# Patient Record
Sex: FEMALE | Race: BLACK OR AFRICAN AMERICAN | HISPANIC OR LATINO | URBAN - METROPOLITAN AREA
[De-identification: names, ages, dates, MRNs, and addresses within clinical notes are randomized per-mention and may not be internally consistent; named-entity substitution may affect disease eponyms.]

---

## 2017-12-28 ENCOUNTER — EMERGENCY (EMERGENCY)
Facility: HOSPITAL | Age: 32
LOS: 0 days | Discharge: HOME | End: 2017-12-28

## 2017-12-28 DIAGNOSIS — R11.0 NAUSEA: ICD-10-CM

## 2017-12-28 DIAGNOSIS — R05 COUGH: ICD-10-CM

## 2017-12-28 DIAGNOSIS — Z79.899 OTHER LONG TERM (CURRENT) DRUG THERAPY: ICD-10-CM

## 2017-12-28 DIAGNOSIS — R07.89 OTHER CHEST PAIN: ICD-10-CM

## 2017-12-28 DIAGNOSIS — Z88.0 ALLERGY STATUS TO PENICILLIN: ICD-10-CM

## 2017-12-28 DIAGNOSIS — F32.9 MAJOR DEPRESSIVE DISORDER, SINGLE EPISODE, UNSPECIFIED: ICD-10-CM

## 2017-12-28 DIAGNOSIS — R74.0 NONSPECIFIC ELEVATION OF LEVELS OF TRANSAMINASE AND LACTIC ACID DEHYDROGENASE [LDH]: ICD-10-CM

## 2017-12-28 DIAGNOSIS — R06.02 SHORTNESS OF BREATH: ICD-10-CM

## 2020-11-12 ENCOUNTER — INPATIENT (INPATIENT)
Facility: HOSPITAL | Age: 35
LOS: 21 days | Discharge: HOME | DRG: 751 | End: 2020-12-04
Attending: PSYCHIATRY & NEUROLOGY | Admitting: PSYCHIATRY & NEUROLOGY
Payer: MEDICAID

## 2020-11-12 VITALS — HEIGHT: 63 IN | WEIGHT: 134.04 LBS | TEMPERATURE: 98 F | OXYGEN SATURATION: 100 % | RESPIRATION RATE: 16 BRPM

## 2020-11-12 DIAGNOSIS — F29 UNSPECIFIED PSYCHOSIS NOT DUE TO A SUBSTANCE OR KNOWN PHYSIOLOGICAL CONDITION: ICD-10-CM

## 2020-11-12 DIAGNOSIS — F20.9 SCHIZOPHRENIA, UNSPECIFIED: ICD-10-CM

## 2020-11-12 PROCEDURE — 93005 ELECTROCARDIOGRAM TRACING: CPT

## 2020-11-12 PROCEDURE — 80074 ACUTE HEPATITIS PANEL: CPT

## 2020-11-12 PROCEDURE — 99223 1ST HOSP IP/OBS HIGH 75: CPT

## 2020-11-12 PROCEDURE — 82728 ASSAY OF FERRITIN: CPT

## 2020-11-12 PROCEDURE — 83540 ASSAY OF IRON: CPT

## 2020-11-12 PROCEDURE — 86769 SARS-COV-2 COVID-19 ANTIBODY: CPT

## 2020-11-12 PROCEDURE — 80053 COMPREHEN METABOLIC PANEL: CPT

## 2020-11-12 PROCEDURE — 86255 FLUORESCENT ANTIBODY SCREEN: CPT

## 2020-11-12 PROCEDURE — 83036 HEMOGLOBIN GLYCOSYLATED A1C: CPT

## 2020-11-12 PROCEDURE — 36415 COLL VENOUS BLD VENIPUNCTURE: CPT

## 2020-11-12 PROCEDURE — 83550 IRON BINDING TEST: CPT

## 2020-11-12 PROCEDURE — 85025 COMPLETE CBC W/AUTO DIFF WBC: CPT

## 2020-11-12 PROCEDURE — 86039 ANTINUCLEAR ANTIBODIES (ANA): CPT

## 2020-11-12 PROCEDURE — 80076 HEPATIC FUNCTION PANEL: CPT

## 2020-11-12 PROCEDURE — 80061 LIPID PANEL: CPT

## 2020-11-12 PROCEDURE — 84702 CHORIONIC GONADOTROPIN TEST: CPT

## 2020-11-12 PROCEDURE — 76705 ECHO EXAM OF ABDOMEN: CPT

## 2020-11-12 PROCEDURE — 84443 ASSAY THYROID STIM HORMONE: CPT

## 2020-11-12 RX ORDER — LANOLIN ALCOHOL/MO/W.PET/CERES
3 CREAM (GRAM) TOPICAL AT BEDTIME
Refills: 0 | Status: DISCONTINUED | OUTPATIENT
Start: 2020-11-12 | End: 2020-12-04

## 2020-11-12 RX ORDER — ACETAMINOPHEN 500 MG
650 TABLET ORAL EVERY 6 HOURS
Refills: 0 | Status: DISCONTINUED | OUTPATIENT
Start: 2020-11-12 | End: 2020-12-04

## 2020-11-12 RX ORDER — RISPERIDONE 4 MG/1
0.5 TABLET ORAL AT BEDTIME
Refills: 0 | Status: DISCONTINUED | OUTPATIENT
Start: 2020-11-12 | End: 2020-12-01
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
## 2020-11-12 NOTE — PATIENT PROFILE BEHAVIORAL HEALTH - NSBHSXPREF_PSY_A_CORE
patient is  and has two children with her  whom are in his custody in [place]/heterosexual/straight
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
## 2020-11-12 NOTE — BEHAVIORAL HEALTH ASSESSMENT NOTE - OTHER PAST PSYCHIATRIC HISTORY (INCLUDE DETAILS REGARDING ONSET, COURSE OF ILLNESS, INPATIENT/OUTPATIENT TREATMENT)
No prior In-patient Psychiatric Hospitalization. No prior SI/Sa. No prior Drug abuse hx including ETOH and cigarettes smoking.  Previous [facility] visits

## 2020-11-13 LAB
A1C WITH ESTIMATED AVERAGE GLUCOSE RESULT: 5.3 % — SIGNIFICANT CHANGE UP (ref 4–5.6)
ALBUMIN SERPL ELPH-MCNC: 3.6 G/DL — SIGNIFICANT CHANGE UP (ref 3.3–5)
ALP SERPL-CCNC: 365 U/L — HIGH (ref 40–120)
ALT FLD-CCNC: 85 U/L — HIGH (ref 12–78)
ANION GAP SERPL CALC-SCNC: 6 MMOL/L — SIGNIFICANT CHANGE UP (ref 5–17)
AST SERPL-CCNC: 75 U/L — HIGH (ref 15–37)
BASOPHILS # BLD AUTO: 0.06 K/UL — SIGNIFICANT CHANGE UP (ref 0–0.2)
BASOPHILS NFR BLD AUTO: 1.1 % — SIGNIFICANT CHANGE UP (ref 0–2)
BILIRUB SERPL-MCNC: 1.1 MG/DL — SIGNIFICANT CHANGE UP (ref 0.2–1.2)
BUN SERPL-MCNC: 17 MG/DL — SIGNIFICANT CHANGE UP (ref 7–23)
CALCIUM SERPL-MCNC: 9.2 MG/DL — SIGNIFICANT CHANGE UP (ref 8.5–10.1)
CHLORIDE SERPL-SCNC: 105 MMOL/L — SIGNIFICANT CHANGE UP (ref 96–108)
CHOLEST SERPL-MCNC: 231 MG/DL — HIGH
CO2 SERPL-SCNC: 28 MMOL/L — SIGNIFICANT CHANGE UP (ref 22–31)
CREAT SERPL-MCNC: 0.91 MG/DL — SIGNIFICANT CHANGE UP (ref 0.5–1.3)
EOSINOPHIL # BLD AUTO: 0.08 K/UL — SIGNIFICANT CHANGE UP (ref 0–0.5)
EOSINOPHIL NFR BLD AUTO: 1.5 % — SIGNIFICANT CHANGE UP (ref 0–6)
ESTIMATED AVERAGE GLUCOSE: 105 MG/DL — SIGNIFICANT CHANGE UP (ref 68–114)
GLUCOSE SERPL-MCNC: 82 MG/DL — SIGNIFICANT CHANGE UP (ref 70–99)
HAV IGM SER-ACNC: SIGNIFICANT CHANGE UP
HBV CORE IGM SER-ACNC: SIGNIFICANT CHANGE UP
HBV SURFACE AG SER-ACNC: SIGNIFICANT CHANGE UP
HCT VFR BLD CALC: 38.5 % — SIGNIFICANT CHANGE UP (ref 34.5–45)
HCV AB S/CO SERPL IA: 0.11 S/CO — SIGNIFICANT CHANGE UP (ref 0–0.99)
HCV AB SERPL-IMP: SIGNIFICANT CHANGE UP
HDLC SERPL-MCNC: 121 MG/DL — SIGNIFICANT CHANGE UP
HGB BLD-MCNC: 12.3 G/DL — SIGNIFICANT CHANGE UP (ref 11.5–15.5)
IMM GRANULOCYTES NFR BLD AUTO: 0.4 % — SIGNIFICANT CHANGE UP (ref 0–1.5)
LIPID PNL WITH DIRECT LDL SERPL: 105 MG/DL — HIGH
LYMPHOCYTES # BLD AUTO: 2.44 K/UL — SIGNIFICANT CHANGE UP (ref 1–3.3)
LYMPHOCYTES # BLD AUTO: 45.7 % — HIGH (ref 13–44)
MCHC RBC-ENTMCNC: 28.9 PG — SIGNIFICANT CHANGE UP (ref 27–34)
MCHC RBC-ENTMCNC: 31.9 GM/DL — LOW (ref 32–36)
MCV RBC AUTO: 90.4 FL — SIGNIFICANT CHANGE UP (ref 80–100)
MONOCYTES # BLD AUTO: 0.54 K/UL — SIGNIFICANT CHANGE UP (ref 0–0.9)
MONOCYTES NFR BLD AUTO: 10.1 % — SIGNIFICANT CHANGE UP (ref 2–14)
NEUTROPHILS # BLD AUTO: 2.2 K/UL — SIGNIFICANT CHANGE UP (ref 1.8–7.4)
NEUTROPHILS NFR BLD AUTO: 41.2 % — LOW (ref 43–77)
NON HDL CHOLESTEROL: 110 MG/DL — SIGNIFICANT CHANGE UP
PLATELET # BLD AUTO: 333 K/UL — SIGNIFICANT CHANGE UP (ref 150–400)
POTASSIUM SERPL-MCNC: 4 MMOL/L — SIGNIFICANT CHANGE UP (ref 3.5–5.3)
POTASSIUM SERPL-SCNC: 4 MMOL/L — SIGNIFICANT CHANGE UP (ref 3.5–5.3)
PROT SERPL-MCNC: 8.1 GM/DL — SIGNIFICANT CHANGE UP (ref 6–8.3)
RBC # BLD: 4.26 M/UL — SIGNIFICANT CHANGE UP (ref 3.8–5.2)
RBC # FLD: 15.4 % — HIGH (ref 10.3–14.5)
SODIUM SERPL-SCNC: 139 MMOL/L — SIGNIFICANT CHANGE UP (ref 135–145)
TRIGL SERPL-MCNC: 30 MG/DL — SIGNIFICANT CHANGE UP
TSH SERPL-MCNC: 1.21 UU/ML — SIGNIFICANT CHANGE UP (ref 0.34–4.82)
WBC # BLD: 5.34 K/UL — SIGNIFICANT CHANGE UP (ref 3.8–10.5)
WBC # FLD AUTO: 5.34 K/UL — SIGNIFICANT CHANGE UP (ref 3.8–10.5)

## 2020-11-13 PROCEDURE — 99231 SBSQ HOSP IP/OBS SF/LOW 25: CPT

## 2020-11-13 PROCEDURE — 99222 1ST HOSP IP/OBS MODERATE 55: CPT

## 2020-11-13 PROCEDURE — 93010 ELECTROCARDIOGRAM REPORT: CPT
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
## 2020-11-13 NOTE — H&P ADULT - ASSESSMENT
Pt is admitted w/  Psychosis  Suicidal Ideation  Depression/Schizophrenia  Mild transaminitis and elevated Alk phos DDX from meds? from recent fall?  - Hepatitis panel wnl  - continue inpatient behavioral health treatment and counseling  - check HCG  - suggest repeat LFTs labs in 1 week and follow up ultrasound of liver  - DVT prophylaxis : ambulation  - IMPROVE VTE Individual Risk Assessment    RISK                                                                Points    [  ] Previous VTE                                                  3    [  ] Thrombophilia                                               2    [  ] Lower limb paralysis                                      2        (unable to hold up >15 seconds)      [  ] Current Cancer                                              2         (within 6 months)    [  ] Immobilization > 24 hrs                                1    [  ] ICU/CCU stay > 24 hours                              1    [  ] Age > 60                                                      1    IMPROVE VTE Score _0________    IMPROVE Score 0-1: Low Risk, No VTE prophylaxis required for most patients, encourage ambulation.   IMPROVE Score 2-3: At risk, pharmacologic VTE prophylaxis is indicated for most patients (in the absence of a contraindication)  IMPROVE Score > or = 4: High Risk, pharmacologic VTE prophylaxis is indicated for most patients (in the absence of a contraindication)

## 2020-11-13 NOTE — H&P ADULT - NSHPPHYSICALEXAM_GEN_ALL_CORE
ICU Vital Signs Last 24 Hrs  T(C): 36.8 (13 Nov 2020 07:23), Max: 36.8 (13 Nov 2020 07:23)  T(F): 98.2 (13 Nov 2020 07:23), Max: 98.2 (13 Nov 2020 07:23)  HR: 88  BP: 120/82    RR: 14 (13 Nov 2020 07:23) (14 - 14)  SpO2: 100% (13 Nov 2020 07:23) (100% - 100%)

## 2020-11-13 NOTE — PROGRESS NOTE BEHAVIORAL HEALTH - NSBHCHARTREVIEWINVESTIGATE_PSY_A_CORE FT
< from: 12 Lead ECG (11.13.20 @ 07:28) >    Ventricular Rate 69 BPM    Atrial Rate 69 BPM    P-R Interval 142 ms    QRS Duration 80 ms    Q-T Interval 378 ms    QTC Calculation(Bazett) 405 ms    P Axis 79 degrees    R Axis 87 degrees    T Axis 69 degrees    Diagnosis Line Normal sinus rhythmwith sinus arrhythmia  Normal ECG  No previous ECGs available  Confirmed by WADE BERNAL, ABBEY GASTELUM ([id number]) on 11/13/2020 12:10:16 PM

## 2020-11-13 NOTE — PROGRESS NOTE BEHAVIORAL HEALTH - NSBHCHARTREVIEWVS_PSY_A_CORE FT
Vital Signs Last 24 Hrs  T(C): 36.8 (13 Nov 2020 07:23), Max: 36.8 (12 Nov 2020 16:20)  T(F): 98.2 (13 Nov 2020 07:23), Max: 98.2 (12 Nov 2020 16:20)  HR: --  BP: --  BP(mean): --  RR: 14 (13 Nov 2020 07:23) (14 - 16)  SpO2: 100% (13 Nov 2020 07:23) (100% - 100%)

## 2020-11-13 NOTE — PROGRESS NOTE BEHAVIORAL HEALTH - NSBHCHARTREVIEWLAB_PSY_A_CORE FT
12.3   5.34  )-----------( 333      ( 13 Nov 2020 07:10 )             38.5   11-13    139  |  105  |  17  ----------------------------<  82  4.0   |  28  |  0.91    Ca    9.2      13 Nov 2020 07:10    TPro  8.1  /  Alb  3.6  /  TBili  1.1  /  DBili  x   /  AST  75<H>  /  ALT  85<H>  /  AlkPhos  365<H>  11-13

## 2020-11-14 LAB
HCG SERPL-ACNC: <1 MIU/ML — SIGNIFICANT CHANGE UP
SARS-COV-2 IGG SERPL QL IA: NEGATIVE — SIGNIFICANT CHANGE UP
SARS-COV-2 IGM SERPL IA-ACNC: 0.09 INDEX — SIGNIFICANT CHANGE UP

## 2020-11-14 PROCEDURE — 99231 SBSQ HOSP IP/OBS SF/LOW 25: CPT

## 2020-11-14 PROCEDURE — 76705 ECHO EXAM OF ABDOMEN: CPT | Mod: 26
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
## 2020-11-14 NOTE — PROGRESS NOTE BEHAVIORAL HEALTH - NSBHCHARTREVIEWVS_PSY_A_CORE FT
Vital Signs Last 24 Hrs  T(C): 36.7 (14 Nov 2020 08:20), Max: 36.7 (14 Nov 2020 08:20)  T(F): 98 (14 Nov 2020 08:20), Max: 98 (14 Nov 2020 08:20)  HR: --  BP: --  BP(mean): --  RR: 18 (14 Nov 2020 08:20) (18 - 18)  SpO2: 99% (14 Nov 2020 08:20) (99% - 99%)

## 2020-11-15 PROCEDURE — 99231 SBSQ HOSP IP/OBS SF/LOW 25: CPT
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
## 2020-11-15 NOTE — PROGRESS NOTE BEHAVIORAL HEALTH - NSBHCHARTREVIEWVS_PSY_A_CORE FT
Vital Signs Last 24 Hrs  T(C): 36.7 (15 Nov 2020 08:08), Max: 36.7 (15 Nov 2020 08:08)  T(F): 98.1 (15 Nov 2020 08:08), Max: 98.1 (15 Nov 2020 08:08)  HR: --  BP: --  BP(mean): --  RR: 18 (15 Nov 2020 08:08) (18 - 18)  SpO2: 100% (15 Nov 2020 08:08) (100% - 100%)

## 2020-11-16 PROCEDURE — 99231 SBSQ HOSP IP/OBS SF/LOW 25: CPT

## 2020-11-17 PROCEDURE — 99231 SBSQ HOSP IP/OBS SF/LOW 25: CPT
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
## 2020-11-17 NOTE — PROGRESS NOTE BEHAVIORAL HEALTH - NSBHCHARTREVIEWVS_PSY_A_CORE FT
Vital Signs Last 24 Hrs  T(C): 36.7 (17 Nov 2020 07:51), Max: 36.7 (17 Nov 2020 07:51)  T(F): 98 (17 Nov 2020 07:51), Max: 98 (17 Nov 2020 07:51)  HR: --  BP: --  BP(mean): --  RR: 14 (17 Nov 2020 07:51) (14 - 14)  SpO2: 100% (17 Nov 2020 07:51) (100% - 100%)

## 2020-11-18 LAB
ALBUMIN SERPL ELPH-MCNC: 3.8 G/DL — SIGNIFICANT CHANGE UP (ref 3.3–5)
ALP SERPL-CCNC: 607 U/L — HIGH (ref 40–120)
ALT FLD-CCNC: 172 U/L — HIGH (ref 12–78)
ANION GAP SERPL CALC-SCNC: 5 MMOL/L — SIGNIFICANT CHANGE UP (ref 5–17)
AST SERPL-CCNC: 155 U/L — HIGH (ref 15–37)
BILIRUB SERPL-MCNC: 0.9 MG/DL — SIGNIFICANT CHANGE UP (ref 0.2–1.2)
BUN SERPL-MCNC: 17 MG/DL — SIGNIFICANT CHANGE UP (ref 7–23)
CALCIUM SERPL-MCNC: 9.1 MG/DL — SIGNIFICANT CHANGE UP (ref 8.5–10.1)
CHLORIDE SERPL-SCNC: 105 MMOL/L — SIGNIFICANT CHANGE UP (ref 96–108)
CO2 SERPL-SCNC: 27 MMOL/L — SIGNIFICANT CHANGE UP (ref 22–31)
CREAT SERPL-MCNC: 0.86 MG/DL — SIGNIFICANT CHANGE UP (ref 0.5–1.3)
GLUCOSE SERPL-MCNC: 81 MG/DL — SIGNIFICANT CHANGE UP (ref 70–99)
POTASSIUM SERPL-MCNC: 4.2 MMOL/L — SIGNIFICANT CHANGE UP (ref 3.5–5.3)
POTASSIUM SERPL-SCNC: 4.2 MMOL/L — SIGNIFICANT CHANGE UP (ref 3.5–5.3)
PROT SERPL-MCNC: 8.9 GM/DL — HIGH (ref 6–8.3)
SODIUM SERPL-SCNC: 137 MMOL/L — SIGNIFICANT CHANGE UP (ref 135–145)

## 2020-11-18 PROCEDURE — 99231 SBSQ HOSP IP/OBS SF/LOW 25: CPT

## 2020-11-18 NOTE — PROGRESS NOTE BEHAVIORAL HEALTH - NSBHCHARTREVIEWVS_PSY_A_CORE FT
Vital Signs Last 24 Hrs  T(C): 36.4 (18 Nov 2020 08:01), Max: 36.4 (18 Nov 2020 08:01)  T(F): 97.6 (18 Nov 2020 08:01), Max: 97.6 (18 Nov 2020 08:01)  HR: --  BP: --  BP(mean): --  RR: 12 (18 Nov 2020 08:01) (12 - 12)  SpO2: 99% (18 Nov 2020 08:01) (99% - 99%)
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
## 2020-11-19 PROCEDURE — 99231 SBSQ HOSP IP/OBS SF/LOW 25: CPT

## 2020-11-19 NOTE — PROGRESS NOTE BEHAVIORAL HEALTH - NSBHCHARTREVIEWVS_PSY_A_CORE FT
Vital Signs Last 24 Hrs  T(C): 36.3 (19 Nov 2020 07:29), Max: 36.3 (19 Nov 2020 07:29)  T(F): 97.4 (19 Nov 2020 07:29), Max: 97.4 (19 Nov 2020 07:29)  HR: --  BP: --  BP(mean): --  RR: 18 (19 Nov 2020 07:29) (18 - 18)  SpO2: 100% (19 Nov 2020 07:29) (100% - 100%)
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
## 2020-11-20 PROCEDURE — 99231 SBSQ HOSP IP/OBS SF/LOW 25: CPT
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
## 2020-11-20 NOTE — PROGRESS NOTE BEHAVIORAL HEALTH - NSBHCHARTREVIEWVS_PSY_A_CORE FT
Vital Signs Last 24 Hrs  T(C): 36.4 (20 Nov 2020 07:43), Max: 36.4 (20 Nov 2020 07:43)  T(F): 97.6 (20 Nov 2020 07:43), Max: 97.6 (20 Nov 2020 07:43)  HR: --  BP: --  BP(mean): --  RR: 18 (20 Nov 2020 07:43) (18 - 18)  SpO2: 100% (20 Nov 2020 07:43) (100% - 100%)

## 2020-11-23 PROCEDURE — 99231 SBSQ HOSP IP/OBS SF/LOW 25: CPT

## 2020-11-23 NOTE — PROGRESS NOTE BEHAVIORAL HEALTH - NSBHCHARTREVIEWVS_PSY_A_CORE FT
Vital Signs Last 24 Hrs  T(C): 36.7 (23 Nov 2020 08:37), Max: 36.7 (23 Nov 2020 08:37)  T(F): 98 (23 Nov 2020 08:37), Max: 98 (23 Nov 2020 08:37)  HR: --  BP: --  BP(mean): --  RR: 16 (23 Nov 2020 08:37) (16 - 16)  SpO2: 100% (23 Nov 2020 08:37) (100% - 100%)

## 2020-11-23 NOTE — CONSULT NOTE ADULT - ASSESSMENT
Imp:  Elevated LFTs in a cholestatic pattern  sono with fatty liver    Rec:  Check autoimmune markers, iron studies  Outpatient follow up with be important, although perhaps will be difficult as I'm not sure she will follow up
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
## 2020-11-23 NOTE — PROGRESS NOTE BEHAVIORAL HEALTH - [facility] NEUROLOGICAL
Post-Care Instructions: I reviewed with the patient in detail post-care instructions. Patient is to wear sunprotection, and avoid picking at any of the treated lesions. Pt may apply Vaseline to crusted or scabbing areas. Render Post-Care Instructions In Note?: no Consent: The patient's consent was obtained including but not limited to risks of crusting, scabbing, blistering, scarring, darker or lighter pigmentary change, recurrence, incomplete removal and infection. Duration Of Freeze Thaw-Cycle (Seconds): 0 Detail Level: Detailed No complaints

## 2020-11-24 PROCEDURE — 99231 SBSQ HOSP IP/OBS SF/LOW 25: CPT

## 2020-11-24 NOTE — PROGRESS NOTE BEHAVIORAL HEALTH - NSBHFUPINTERVALHXFT_PSY_A_CORE
Patient was seen, evaluated today AM. She remains rigid not to take meds, as she believes that she does not need meds. Patient has increased LFT, consult was called in for GI, she was seen by GI and was advised to go for further tests today , She refused further testes today AM. To apply for meds over objection in court, papers are filled in for court with labs and meds as needed for stability. To continue with .

## 2020-11-24 NOTE — PROGRESS NOTE BEHAVIORAL HEALTH - NSBHCHARTREVIEWVS_PSY_A_CORE FT
Vital Signs Last 24 Hrs  T(C): 36.7 (24 Nov 2020 07:59), Max: 36.7 (24 Nov 2020 07:59)  T(F): 98.1 (24 Nov 2020 07:59), Max: 98.1 (24 Nov 2020 07:59)  HR: --  BP: --  BP(mean): --  RR: 18 (24 Nov 2020 07:59) (18 - 18)  SpO2: 100% (24 Nov 2020 07:59) (100% - 100%)

## 2020-11-25 LAB
ALBUMIN SERPL ELPH-MCNC: 3.7 G/DL — SIGNIFICANT CHANGE UP (ref 3.3–5)
ALP SERPL-CCNC: 735 U/L — HIGH (ref 40–120)
ALT FLD-CCNC: 232 U/L — HIGH (ref 12–78)
AST SERPL-CCNC: 183 U/L — HIGH (ref 15–37)
BILIRUB DIRECT SERPL-MCNC: 0.3 MG/DL — HIGH (ref 0–0.2)
BILIRUB INDIRECT FLD-MCNC: 0.4 MG/DL — SIGNIFICANT CHANGE UP (ref 0.2–1)
BILIRUB SERPL-MCNC: 0.7 MG/DL — SIGNIFICANT CHANGE UP (ref 0.2–1.2)
PROT SERPL-MCNC: 8.5 GM/DL — HIGH (ref 6–8.3)

## 2020-11-25 PROCEDURE — 99231 SBSQ HOSP IP/OBS SF/LOW 25: CPT

## 2020-11-25 NOTE — PROGRESS NOTE ADULT - ASSESSMENT
Imp:  Rising LFTs, cholestatic  Care limited by her limited cooperation    Rec;  Repeat labs tomorrow  CT or MRI would be helpful but probably not realistic at this point

## 2020-11-25 NOTE — PROGRESS NOTE ADULT - SUBJECTIVE AND OBJECTIVE BOX
Patient is a 35y old  Female who presents with a chief complaint of Psychosis  Suicidal Ideation  Depression/Schizophrenia (23 Nov 2020 17:17)      Subective:  Refused labs  Labs today continue to show rising LFTs    PAST MEDICAL & SURGICAL HISTORY:  Asthma    Anxiety    No significant past surgical history        MEDICATIONS  (STANDING):  risperiDONE   Tablet 0.5 milliGRAM(s) Oral at bedtime    MEDICATIONS  (PRN):  acetaminophen   Tablet .. 650 milliGRAM(s) Oral every 6 hours PRN Temp greater or equal to 38C (100.4F), Moderate Pain (4 - 6)  aluminum hydroxide/magnesium hydroxide/simethicone Suspension 30 milliLiter(s) Oral every 4 hours PRN Dyspepsia  melatonin 3 milliGRAM(s) Oral at bedtime PRN Insomnia      REVIEW OF SYSTEMS:    RESPIRATORY: No shortness of breath  CARDIOVASCULAR: No chest pain  All other review of systems is negative unless indicated above.    Vital Signs Last 24 Hrs  T(C): 36.6 (25 Nov 2020 08:04), Max: 36.6 (25 Nov 2020 08:04)  T(F): 97.8 (25 Nov 2020 08:04), Max: 97.8 (25 Nov 2020 08:04)  HR: --  BP: --  BP(mean): --  RR: 14 (25 Nov 2020 08:04) (14 - 14)  SpO2: 100% (25 Nov 2020 08:04) (100% - 100%)    PHYSICAL EXAM:    Constitutional: NAD, odd, doesn't answer my questions  Respiratory: CTAB  Cardiovascular: S1 and S2, RRR  Gastrointestinal: BS+, soft, NT/ND  Extremities: No peripheral edema    LABS:        TPro  8.5<H>  /  Alb  3.7  /  TBili  0.7  /  DBili  0.3<H>  /  AST  183<H>  /  ALT  232<H>  /  AlkPhos  735<H>  11-25      LIVER FUNCTIONS - ( 25 Nov 2020 12:03 )  Alb: 3.7 g/dL / Pro: 8.5 gm/dL / ALK PHOS: 735 U/L / ALT: 232 U/L / AST: 183 U/L / GGT: x             RADIOLOGY & ADDITIONAL STUDIES:

## 2020-11-25 NOTE — PROGRESS NOTE BEHAVIORAL HEALTH - NSBHCHARTREVIEWVS_PSY_A_CORE FT
Vital Signs Last 24 Hrs  T(C): 36.6 (25 Nov 2020 08:04), Max: 36.6 (25 Nov 2020 08:04)  T(F): 97.8 (25 Nov 2020 08:04), Max: 97.8 (25 Nov 2020 08:04)  HR: --  BP: --  BP(mean): --  RR: 14 (25 Nov 2020 08:04) (14 - 14)  SpO2: 100% (25 Nov 2020 08:04) (100% - 100%)

## 2020-11-25 NOTE — PROGRESS NOTE BEHAVIORAL HEALTH - NSBHFUPINTERVALHXFT_PSY_A_CORE
Patient was seen, evaluated today AM. She was in her room, doing puzzles with a marker. Mood seems OK, guarded as always, limited conversation with inconclusive answers, endorsing that she is leaving today, she was informed that the court will decide as the court is scheduled on Friday next week. She continues to refuse labs, meds for reasons unknown, prefers to stay quiet, was earlier seen by GI doctor as she has increased Liver Alkaline Phosphatase and as per consultant he ordered different blood work to r/o other causes of increased enzymes, she refused all blood work, endorsing that she would be able to do the blood work as out-patient.

## 2020-11-26 LAB
ALBUMIN SERPL ELPH-MCNC: 3.7 G/DL — SIGNIFICANT CHANGE UP (ref 3.3–5)
ALP SERPL-CCNC: 707 U/L — HIGH (ref 40–120)
ALT FLD-CCNC: 210 U/L — HIGH (ref 12–78)
AST SERPL-CCNC: 149 U/L — HIGH (ref 15–37)
BILIRUB DIRECT SERPL-MCNC: 0.3 MG/DL — HIGH (ref 0–0.2)
BILIRUB INDIRECT FLD-MCNC: 0.9 MG/DL — SIGNIFICANT CHANGE UP (ref 0.2–1)
BILIRUB SERPL-MCNC: 1.2 MG/DL — SIGNIFICANT CHANGE UP (ref 0.2–1.2)
FERRITIN SERPL-MCNC: 21 NG/ML — SIGNIFICANT CHANGE UP (ref 15–150)
IRON SATN MFR SERPL: 21 % — SIGNIFICANT CHANGE UP (ref 14–50)
IRON SATN MFR SERPL: 94 UG/DL — SIGNIFICANT CHANGE UP (ref 30–160)
PROT SERPL-MCNC: 8.4 GM/DL — HIGH (ref 6–8.3)
TIBC SERPL-MCNC: 453 UG/DL — HIGH (ref 220–430)
UIBC SERPL-MCNC: 359 UG/DL — SIGNIFICANT CHANGE UP (ref 110–370)

## 2020-11-27 LAB — ANA TITR SER: NEGATIVE — SIGNIFICANT CHANGE UP

## 2020-11-27 PROCEDURE — 99231 SBSQ HOSP IP/OBS SF/LOW 25: CPT

## 2020-11-27 NOTE — PROGRESS NOTE BEHAVIORAL HEALTH - NSBHFUPINTERVALHXFT_PSY_A_CORE
Patient was seen, evaluated today AM. She was in her room, doing puzzles with a marker. Mood seems OK, guarded as always, limited conversation with inconclusive answers, endorsing that she is leaving today, she was informed that the court will decide as the court is scheduled on Friday next week. She continues to refuse labs, meds for reasons unknown, prefers to stay quiet, was earlier seen by GI doctor as she has increased Liver Alkaline Phosphatase and as per consultant he ordered different blood work to r/o other causes of increased enzymes, she refused all blood work, endorsing that she would be able to do the blood work as out-patient.    11/27/2020: Patient was seen today AM. Mood in control, but continues to remain, guarded, suspicious and paranoid and limited in all categories. She was informed that the court papers are sent and to wait for court medicated discharge, she shooks her head, and endorses that why she can't go home and the court can go there. She was explained  and continues to ask the same question, and later was advised to hear from nursing. Yuly RN was informed to convey her the message, she endorses that SANA Emery told her a few times and she keeps on repeating simple issues. She continues to have Limited insight to her current mental d/o.

## 2020-11-27 NOTE — PROGRESS NOTE BEHAVIORAL HEALTH - NSBHCHARTREVIEWVS_PSY_A_CORE FT
Vital Signs Last 24 Hrs  T(C): 36.7 (27 Nov 2020 08:07), Max: 36.7 (27 Nov 2020 08:07)  T(F): 98.1 (27 Nov 2020 08:07), Max: 98.1 (27 Nov 2020 08:07)  HR: --  BP: --  BP(mean): --  RR: 14 (27 Nov 2020 08:07) (14 - 14)  SpO2: 100% (27 Nov 2020 08:07) (100% - 100%)

## 2020-11-29 LAB
MITOCHONDRIA AB SER-ACNC: SIGNIFICANT CHANGE UP
SMOOTH MUSCLE AB SER-ACNC: SIGNIFICANT CHANGE UP

## 2020-11-30 PROCEDURE — 99231 SBSQ HOSP IP/OBS SF/LOW 25: CPT

## 2020-11-30 NOTE — PROGRESS NOTE BEHAVIORAL HEALTH - NSBHCHARTREVIEWVS_PSY_A_CORE FT
Vital Signs Last 24 Hrs  T(C): 36.8 (30 Nov 2020 08:17), Max: 36.8 (30 Nov 2020 08:17)  T(F): 98.2 (30 Nov 2020 08:17), Max: 98.2 (30 Nov 2020 08:17)  HR: --  BP: --  BP(mean): --  RR: 16 (30 Nov 2020 08:17) (16 - 16)  SpO2: 98% (30 Nov 2020 08:17) (98% - 98%)

## 2020-11-30 NOTE — PROGRESS NOTE BEHAVIORAL HEALTH - NSBHFUPINTERVALHXFT_PSY_A_CORE
Patient was seen, evaluated today AM. She was in her room, doing puzzles with a marker. Mood seems OK, guarded as always, limited conversation with inconclusive answers, endorsing that she is leaving today, she was informed that the court will decide as the court is scheduled on Friday next week. She continues to refuse labs, meds for reasons unknown, prefers to stay quiet, was earlier seen by GI doctor as she has increased Liver Alkaline Phosphatase and as per consultant he ordered different blood work to r/o other causes of increased enzymes, she refused all blood work, endorsing that she would be able to do the blood work as out-patient.    11/27/2020: Patient was seen today AM. Mood in control, but continues to remain, guarded, suspicious and paranoid and limited in all categories. She was informed that the court papers are sent and to wait for court medicated discharge, she shooks her head, and endorses that why she can't go home and the court can go there. She was explained  and continues to ask the same question, and later was advised to hear from nursing. Yuly RN was informed to convey her the message, she endorses that SANA Emery told her a few times and she keeps on repeating simple issues. She continues to have Limited insight to her current mental d/o.    11/30/2020: Patient was approached in her room today AM, she was in her bed looking out through the window, writer called her name she did not respond, only to turn her face for few secs and then she continued to look out through the window. RN report seems OK, still refusing everything including VSS and meds/Labs.

## 2020-12-01 PROCEDURE — 99231 SBSQ HOSP IP/OBS SF/LOW 25: CPT

## 2020-12-01 RX ORDER — ARIPIPRAZOLE 15 MG/1
2 TABLET ORAL DAILY
Refills: 0 | Status: DISCONTINUED | OUTPATIENT
Start: 2020-12-01 | End: 2020-12-04

## 2020-12-01 NOTE — PROGRESS NOTE BEHAVIORAL HEALTH - NSBHFUPINTERVALHXFT_PSY_A_CORE
Patient was seen, evaluated today AM. She was in her room, doing puzzles with a marker. Mood seems OK, guarded as always, limited conversation with inconclusive answers, endorsing that she is leaving today, she was informed that the court will decide as the court is scheduled on Friday next week. She continues to refuse labs, meds for reasons unknown, prefers to stay quiet, was earlier seen by GI doctor as she has increased Liver Alkaline Phosphatase and as per consultant he ordered different blood work to r/o other causes of increased enzymes, she refused all blood work, endorsing that she would be able to do the blood work as out-patient.    11/27/2020: Patient was seen today AM. Mood in control, but continues to remain, guarded, suspicious and paranoid and limited in all categories. She was informed that the court papers are sent and to wait for court medicated discharge, she shooks her head, and endorses that why she can't go home and the court can go there. She was explained  and continues to ask the same question, and later was advised to hear from nursing. Yuly RN was informed to convey her the message, she endorses that SANA Emery told her a few times and she keeps on repeating simple issues. She continues to have Limited insight to her current mental d/o.    11/30/2020: Patient was approached in her room today AM, she was in her bed looking out through the window, writer called her name she did not respond, only to turn her face for few secs and then she continued to look out through the window. RN report seems OK, still refusing everything including VSS and meds/Labs.    12/01/2020: Patient was seen today AM. prefers to stay in her room. Refusing meds since she came here except the first dosage of Risperdal 0.5 mg daily. Today she had the court hearing with Dr. Dawson and to go to court on Friday for meds over objection. She was informed that she has to think of alternatives because if the  decides to discharge her and her brother refusing to let her in she may have to go to a shelter. Abilify order in place as she had the pass out behavior when she was prescribed Risperdal at [facility].
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
## 2020-12-02 PROCEDURE — 99231 SBSQ HOSP IP/OBS SF/LOW 25: CPT

## 2020-12-02 NOTE — PROGRESS NOTE BEHAVIORAL HEALTH - NSBHFUPINTERVALHXFT_PSY_A_CORE
Patient was seen, evaluated today AM. She was in her room, doing puzzles with a marker. Mood seems OK, guarded as always, limited conversation with inconclusive answers, endorsing that she is leaving today, she was informed that the court will decide as the court is scheduled on Friday next week. She continues to refuse labs, meds for reasons unknown, prefers to stay quiet, was earlier seen by GI doctor as she has increased Liver Alkaline Phosphatase and as per consultant he ordered different blood work to r/o other causes of increased enzymes, she refused all blood work, endorsing that she would be able to do the blood work as out-patient.    11/27/2020: Patient was seen today AM. Mood in control, but continues to remain, guarded, suspicious and paranoid and limited in all categories. She was informed that the court papers are sent and to wait for court medicated discharge, she shooks her head, and endorses that why she can't go home and the court can go there. She was explained  and continues to ask the same question, and later was advised to hear from nursing. Yuly RN was informed to convey her the message, she endorses that SANA Emery told her a few times and she keeps on repeating simple issues. She continues to have Limited insight to her current mental d/o.    11/30/2020: Patient was approached in her room today AM, she was in her bed looking out through the window, writer called her name she did not respond, only to turn her face for few secs and then she continued to look out through the window. RN report seems OK, still refusing everything including VSS and meds/Labs.    12/01/2020: Patient was seen today AM. prefers to stay in her room. Refusing meds since she came here except the first dosage of Risperdal 0.5 mg daily. Today she had the court hearing with Dr. Dawson and to go to court on Friday for meds over objection. She was informed that she has to think of alternatives because if the  decides to discharge her and her brother refusing to let her in she may have to go to a shelter. Abilify order in place as she had the pass out behavior when she was prescribed Risperdal at [facility].    12/03/2020: Patient was seen today AM. Mood in control, no aggression or agitation, continues to remain paranoid or suspicious, prefers to remain alone and continues to do so. Was ordered Abilify 2 mg daily starting today, she refused and instead she now wants to go court for discharge. No meds changes till now, and she refused all labs and VSS as needed for stability

## 2020-12-02 NOTE — PROGRESS NOTE BEHAVIORAL HEALTH - NSBHCHARTREVIEWVS_PSY_A_CORE FT
Vital Signs Last 24 Hrs  T(C): 36.9 (02 Dec 2020 07:59), Max: 36.9 (02 Dec 2020 07:59)  T(F): 98.5 (02 Dec 2020 07:59), Max: 98.5 (02 Dec 2020 07:59)  HR: --  BP: --  BP(mean): --  RR: 18 (02 Dec 2020 07:59) (18 - 18)  SpO2: 100% (02 Dec 2020 07:59) (100% - 100%)

## 2020-12-03 PROCEDURE — 99231 SBSQ HOSP IP/OBS SF/LOW 25: CPT
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
## 2020-12-03 NOTE — PROGRESS NOTE BEHAVIORAL HEALTH - NSBHCHARTREVIEWVS_PSY_A_CORE FT
Vital Signs Last 24 Hrs  T(C): 36.8 (03 Dec 2020 08:11), Max: 36.8 (03 Dec 2020 08:11)  T(F): 98.2 (03 Dec 2020 08:11), Max: 98.2 (03 Dec 2020 08:11)  HR: --  BP: --  BP(mean): --  RR: 18 (03 Dec 2020 08:11) (18 - 18)  SpO2: 100% (03 Dec 2020 08:11) (100% - 100%)

## 2020-12-03 NOTE — PROGRESS NOTE BEHAVIORAL HEALTH - NSBHFUPINTERVALHXFT_PSY_A_CORE
Patient was seen, evaluated today AM. She was in her room, doing puzzles with a marker. Mood seems OK, guarded as always, limited conversation with inconclusive answers, endorsing that she is leaving today, she was informed that the court will decide as the court is scheduled on Friday next week. She continues to refuse labs, meds for reasons unknown, prefers to stay quiet, was earlier seen by GI doctor as she has increased Liver Alkaline Phosphatase and as per consultant he ordered different blood work to r/o other causes of increased enzymes, she refused all blood work, endorsing that she would be able to do the blood work as out-patient.    11/27/2020: Patient was seen today AM. Mood in control, but continues to remain, guarded, suspicious and paranoid and limited in all categories. She was informed that the court papers are sent and to wait for court medicated discharge, she shooks her head, and endorses that why she can't go home and the court can go there. She was explained  and continues to ask the same question, and later was advised to hear from nursing. Yuly RN was informed to convey her the message, she endorses that SANA Emery told her a few times and she keeps on repeating simple issues. She continues to have Limited insight to her current mental d/o.    11/30/2020: Patient was approached in her room today AM, she was in her bed looking out through the window, writer called her name she did not respond, only to turn her face for few secs and then she continued to look out through the window. RN report seems OK, still refusing everything including VSS and meds/Labs.    12/01/2020: Patient was seen today AM. prefers to stay in her room. Refusing meds since she came here except the first dosage of Risperdal 0.5 mg daily. Today she had the court hearing with Dr. Dawson and to go to court on Friday for meds over objection. She was informed that she has to think of alternatives because if the  decides to discharge her and her brother refusing to let her in she may have to go to a shelter. Abilify order in place as she had the pass out behavior [facility]    12/03/2020: Patient was seen today AM. Mood in control, no aggression or agitation, continues to remain paranoid or suspicious, prefers to remain alone and continues to do so. Was ordered Abilify 2 mg daily starting today, she refused and instead she now wants to go court for discharge. No meds changes till now, and she refused all labs and VSS as needed for stability    12/03/2020: Patient was seen today AM. She prefers to stay in her room , staring out the window most of the time, with limited to no participation in unit activities. She was approached multiple times in the past and nothing seems to help her as she is fixated not to take meds under any circumstances. She was earlier approached by [facility] and when she was mentioned that her brother would not let her sty there if she does not take meds she got upset and refused to talk with SW further. She continues to shrug off any concerns as if she don't care for anything. Court Day tomorrow for discharge.

## 2020-12-04 VITALS — TEMPERATURE: 98 F | OXYGEN SATURATION: 100 % | RESPIRATION RATE: 15 BRPM

## 2020-12-04 PROCEDURE — 99239 HOSP IP/OBS DSCHRG MGMT >30: CPT

## 2020-12-04 RX ORDER — ARIPIPRAZOLE 15 MG/1
1 TABLET ORAL
Qty: 0 | Refills: 0 | DISCHARGE
Start: 2020-12-04

## 2020-12-04 NOTE — PROGRESS NOTE BEHAVIORAL HEALTH - AFFECT CONGRUENCE
Congruent

## 2020-12-04 NOTE — PROGRESS NOTE BEHAVIORAL HEALTH - NSBHADMITDANGERSELF_PSY_A_CORE
unable to care for self

## 2020-12-04 NOTE — PROGRESS NOTE BEHAVIORAL HEALTH - NSBHCHARTREVIEWVS_PSY_A_CORE FT
Vital Signs Last 24 Hrs  T(C): 36.7 (04 Dec 2020 07:52), Max: 36.7 (04 Dec 2020 07:52)  T(F): 98.1 (04 Dec 2020 07:52), Max: 98.1 (04 Dec 2020 07:52)  HR: --  BP: --  BP(mean): --  RR: 15 (04 Dec 2020 07:52) (15 - 15)  SpO2: 100% (04 Dec 2020 07:52) (100% - 100%)

## 2020-12-04 NOTE — PROGRESS NOTE BEHAVIORAL HEALTH - PROBLEM SELECTOR PLAN 1
1. Risperdal 0.5 mg HS and titrate as needed
1. Abilify 2 mg Daily and titrate as needed
1. Abilify 2 mg Daily and titrate as needed
1. Risperdal 0.5 mg HS and titrate as needed
1. Abilify 2 mg Daily and titrate as needed

## 2020-12-04 NOTE — PROGRESS NOTE BEHAVIORAL HEALTH - MOOD
Anxious

## 2020-12-04 NOTE — PROGRESS NOTE BEHAVIORAL HEALTH - SECONDARY DX1
Schizophrenia, unspecified type

## 2020-12-04 NOTE — PROGRESS NOTE BEHAVIORAL HEALTH - NSBHFUPINTERVALHXFT_PSY_A_CORE
Patient was seen, evaluated today AM. She was in her room, doing puzzles with a marker. Mood seems OK, guarded as always, limited conversation with inconclusive answers, endorsing that she is leaving today, she was informed that the court will decide as the court is scheduled on Friday next week. She continues to refuse labs, meds for reasons unknown, prefers to stay quiet, was earlier seen by GI doctor as she has increased Liver Alkaline Phosphatase and as per consultant he ordered different blood work to r/o other causes of increased enzymes, she refused all blood work, endorsing that she would be able to do the blood work as out-patient.    11/27/2020: Patient was seen today AM. Mood in control, but continues to remain, guarded, suspicious and paranoid and limited in all categories. She was informed that the court papers are sent and to wait for court medicated discharge, she shooks her head, and endorses that why she can't go home and the court can go there. She was explained  and continues to ask the same question, and later was advised to hear from nursing. Yuly RN was informed to convey her the message, she endorses that SANA Emery told her a few times and she keeps on repeating simple issues. She continues to have Limited insight to her current mental d/o.    11/30/2020: Patient was approached in her room today AM, she was in her bed looking out through the window, writer called her name she did not respond, only to turn her face for few secs and then she continued to look out through the window. RN report seems OK, still refusing everything including VSS and meds/Labs.    12/01/2020: Patient was seen today AM. prefers to stay in her room. Refusing meds since she came here except the first dosage of Risperdal 0.5 mg daily. Today she had the court hearing with Dr. Dawson and to go to court on Friday for meds over objection. She was informed that she has to think of alternatives because if the  decides to discharge her and her brother refusing to let her in she may have to go to a shelter. Abilify order in place as she had the pass out behavior when she was prescribed Risperdal at [facility].    12/03/2020: Patient was seen today AM. Mood in control, no aggression or agitation, continues to remain paranoid or suspicious, prefers to remain alone and continues to do so. Was ordered Abilify 2 mg daily starting today, she refused and instead she now wants to go court for discharge. No meds changes till now, and she refused all labs and VSS as needed for stability    12/03/2020: Patient was seen today AM. She prefers to stay in her room , staring out the window most of the time, with limited to no participation in unit activities. She was approached multiple times in the past and nothing seems to help her as she is fixated not to take meds under any circumstances. She was earlier approached by [facility] and when she was mentioned that her brother would not let her sty there if she does not take meds she got upset and refused to talk with SW further. She continues to shrug off any concerns as if she don't care for anything. Court Day tomorrow for discharge.    12/04/2020; Patient was seen today AM. She was as usual in her room sitting quietly. Was informed of the court day today. Not taking any meds, and to wait for court hearing today afternoon. She was informed whatever the court decides we would honor Court and work accordingly.

## 2020-12-04 NOTE — DISCHARGE NOTE BEHAVIORAL HEALTH - NSBHDCDXVALIDYESFT_PSY_A_CORE
DIAGNOSIS DSM-V:   Psychiatric Diagnosis (Corresponds to DSM-IV Axis I, II):  Primary Dx Psychosis, unspecified psychosis type F29. Secondary Dx 1 Schizophrenia, unspecified type F20.9. DIAGNOSIS DSM-V: Psychosis--Unspecified  Psychiatric Diagnosis (Corresponds to DSM-IV Axis I, II):  Primary Dx Psychosis, unspecified psychosis type F29. Secondary Dx 1 Schizophrenia, unspecified type F20.9.

## 2020-12-04 NOTE — DISCHARGE NOTE BEHAVIORAL HEALTH - CARE PROVIDER_API CALL
Court Mediated Discharge,   See SW Referral for F/U care  Phone: (   )    -  Fax: (   )    -  Follow Up Time:

## 2020-12-04 NOTE — DISCHARGE NOTE BEHAVIORAL HEALTH - CONDITIONS AT DISCHARGE
Pt is A&Ox4 and denies any pain/discomfort. Pt was provided information on discharge instructions and medications but declined teaching; declined aftercare. Pt is being discharged home via taxi and has all belongings and valuables in her possession. Pt is in clothing and shoes appropriate for weather and setting.

## 2020-12-04 NOTE — PROGRESS NOTE BEHAVIORAL HEALTH - NS ED BHA REVIEW OF ED CHART AVAILABLE IMAGING REVIEWED
Patient Name: Alicia Angela   MRN: [id number]    Chandan Flynn is a 74 y.o. female who presents with the following: With daughter. Patient noticed that at the tip of her fourth left toe underneath her toenail, area appears to be black. Asymptomatic and denies any pain, fever, redness, discharge. Believes that she may have ran her toe into the door about a month ago. Has a history of MS and has some baseline peripheral neuropathy. Does not have a podiatrist.  States that it is difficult for her to take care of her toenails. Review of Systems   Constitutional: Negative for fever, malaise/fatigue and weight loss. Respiratory: Negative for cough, hemoptysis, shortness of breath and wheezing. Cardiovascular: Negative for chest pain, palpitations, leg swelling and PND. Gastrointestinal: Negative for abdominal pain, constipation, diarrhea, nausea and vomiting. The patient's medications, allergies, past medical history, surgical history, family history and social history were reviewed and updated where appropriate. Prior to Admission medications    Medication Sig Start Date End Date Taking? Authorizing Provider   simvastatin (ZOCOR) 40 mg tablet TAKE 1 TABLET EVERY EVENING 1/14/19  Yes Roselyn Jc MD   buPROPion XL (WELLBUTRIN XL) 150 mg tablet Take 1 Tab by mouth every morning. 11/29/18  Yes Roselyn Jc MD   losartan-hydroCHLOROthiazide [facility]) 100-25 mg per tablet Take 1 Tab by mouth daily. For blood pressure 10/23/18  Yes Roselyn Jc MD   latanoprost (XALATAN) 0.005 % ophthalmic solution Administer 1 Drop to both eyes daily.    Yes Provider, Historical   timolol (TIMOPTIC) 0.5 % ophthalmic solution INSTILL 1 DROP INTO INTO BOTH EYES TWICE A DAY 9/20/18  Yes Provider, Historical   OTHER Briefs and pads (uses both) for urinary incontinence, 3 of each per day, G35, prog fair, SHILOH 99m 10/2/18  Yes Roselyn Jc MD   traMADol (ULTRAM) 50 mg tablet TAKE 1 OR 2 TABLETS EVERY 4 HOURS AS NEEDED FOR PAIN (MAX IS 8 PER DAY) 10/2/18  Yes Jessi Jc MD   naproxen (NAPROSYN) 500 mg tablet TAKE ONE TABLET BY MOUTH TWICE DAILY AS NEEDED 9/4/18  Yes Jessi Jc MD   levothyroxine (SYNTHROID) 112 mcg tablet TAKE 1 TABLET BY MOUTH DAILY BEFORE BREAKFAST 5/18/18  Yes Jessi Jc MD   brinzolamide (AZOPT) 1 % ophthalmic suspension Administer 1 Drop to both eyes two (2) times a day. Yes Provider, Historical   dimethyl fumarate 240 mg cpDR Take 240 mg by mouth two (2) times a day. Yes Provider, Historical   erythromycin (ILOTYCIN) ophthalmic ointment  3/30/16  Yes Provider, Historical   DULoxetine (CYMBALTA) 30 mg capsule Take 30 mg by mouth daily. 1/18/16  Yes Provider, Historical   cycloSPORINE (RESTASIS) 0.05 % ophthalmic emulsion Administer 1 Drop to both eyes two (2) times a day. Yes Provider, Historical   therapeutic multivitamin (THERAGRAN) tablet Take 1 Tab by mouth daily. Yes Provider, Historical   acidophilus-sporogenes (ACIDOPHILUS EXTRA STRENGTH) 35 million- 25 million cell Tab Take 1 Tab by mouth daily. Yes Provider, Historical   aspirin delayed-release 81 mg tablet Take 81 mg by mouth daily. Yes Provider, Historical   loperamide (IMODIUM) 2 mg capsule Take  by mouth as needed. Yes Provider, Historical   cyanocobalamin (VITAMIN B-12) 1,000 mcg tablet Take 1,000 mcg by mouth daily. Yes Provider, Historical   gabapentin (NEURONTIN) 100 mg capsule Take 3 Caps by mouth nightly as needed (rarely uses for leg pain). 7/15/17   Jessi Jc MD   bupivacaine-EPINEPHrine (MARCAINE-EPINEPHRINE) 0.25 %-1:200,000 soln Used for mohs surgery 4/19/17   Agustín Fuentes MD       Allergies   Allergen Reactions    Adhes.  Band-Tape-Benzalkonium Rash    Alphagan P [Brimonidine] Other (comments)     Severe reaction and damage to the cornea           OBJECTIVE    Visit Vitals  /80 (BP 1 Location: Right arm, BP Patient Position: Sitting)   Pulse 63   Temp 98 °F (36.7 °C) (Oral)   Resp 18   Ht 5' 3\" (1.6 m)   SpO2 97%   BMI 39.15 kg/m²       Physical Exam   Constitutional: She is oriented to person, place, and time and well-developed, well-nourished, and in no distress. No distress. Eyes: Conjunctivae and EOM are normal. Pupils are equal, round, and reactive to light. Cardiovascular: Normal rate, regular rhythm and normal heart sounds. Exam reveals no gallop and no friction rub. No murmur heard. Pulses:       Dorsalis pedis pulses are 2+ on the left side. Posterior tibial pulses are 2+ on the left side. Pulmonary/Chest: Effort normal and breath sounds normal. No respiratory distress. She has no wheezes. Musculoskeletal: Normal range of motion. She exhibits no edema or tenderness. Left foot: There is deformity. There is normal range of motion, no tenderness, no bony tenderness, no swelling, normal capillary refill, no crepitus and no laceration. Feet:    Neurological: She is alert and oriented to person, place, and time. Gait normal.   Monofilament exam intact. Skin: Skin is warm and dry. No rash noted. She is not diaphoretic. No erythema. Psychiatric: Mood, memory, affect and judgment normal.   Nursing note and vitals reviewed. William Paige is a 74 y.o. female who presents today for:    1. Deformity of toenail  Suspect old hematoma underneath nail. Remains asymptomatic and nail is growing. Recommend nightly Epsom salt soaks and referral to podiatry. Reviewed signs and symptoms that would indicate a worsening medical condition which would require immediate evaluation and treatment; patient expressed understanding of plan. - REFERRAL TO PODIATRY       Medications Discontinued During This Encounter   Medication Reason    predniSONE (DELTASONE) 10 mg tablet Therapy Completed       Follow-up Disposition:  Return if symptoms worsen or fail to improve.     Medication risks/benefits/costs/interactions/alternatives discussed with patient. Advised patient to call back or return to office if symptoms worsen/change/persist. If patient cannot reach us or should anything more severe/urgent arise he/she should proceed directly to the nearest emergency department. Discussed expected course/resolution/complications of diagnosis in detail with patient. Patient given a written after visit summary which includes his/her diagnoses, current medications and vitals. Patient expressed understanding with the diagnosis and plan. Jose Steele M.D. None available

## 2020-12-04 NOTE — DISCHARGE NOTE BEHAVIORAL HEALTH - NSBHDCTESTSFT_PSY_A_CORE
Thyroid Stimulating Hormone, Serum: 1.21 uU/mL   A1C with Estimated Average Glucose Result: 5.3: Method: Immunoassay   Lipid Profile (11.13.20 @ 07:10)   Cholesterol, Serum: 231 mg/dL   Triglycerides, Serum: 30 mg/dL   HDL Cholesterol, Serum: 121: Test Repeated mg/dL   Non HDL Cholesterol: 110: Test Repeated

## 2020-12-04 NOTE — DISCHARGE NOTE BEHAVIORAL HEALTH - PROVIDER TOKENS
FREE:[LAST:[Court Mediated Discharge],PHONE:[(   )    -],FAX:[(   )    -],ADDRESS:[See  Referral for F/U care]]

## 2020-12-04 NOTE — DISCHARGE NOTE BEHAVIORAL HEALTH - NSBHDCCRISISPLAN1FT_PSY_A_CORE
Advised to return to hospital or go to nearest ED or call 911 or [phone] or [phone] hotlines for any severe, worsening or persistent symptoms including suicidal/homicidal ideations, intent or plans. Patient verbalized understanding of instructions.

## 2020-12-04 NOTE — PROGRESS NOTE BEHAVIORAL HEALTH - PRIMARY DX
Psychosis, unspecified psychosis type

## 2020-12-04 NOTE — PROGRESS NOTE BEHAVIORAL HEALTH - NS ED BHA AXIS I SECONDARY1 CODE FT
F20.9

## 2020-12-04 NOTE — DISCHARGE NOTE BEHAVIORAL HEALTH - MEDICATION SUMMARY - MEDICATIONS TO TAKE
I will START or STAY ON the medications listed below when I get home from the hospital:    ARIPiprazole 2 mg oral tablet  -- 1 tab(s) by mouth once a day  -- Indication: For Psychosis

## 2020-12-04 NOTE — DISCHARGE NOTE BEHAVIORAL HEALTH - NSBHDCREFEROTHERFT_PSY_A_CORE
Pt. is refusing all aftercare assistance electing to make her own arrangements as needed. Please see the following local clinics to refer self after discharge:  1) [facility]  Address: [address] [place] [id number]  Phone: [phone]    2)[facility]  Address: [address] [place] [address]  Phone: [phone]    3)[facility]  Address: [address] [address]  Phone: [phone]    4)[facility]- medical and behavioral health services  Phone: [phone]  Address: [address] [place] [address]    Crisis Resource:  [facility] DASH 24hr/7d hotline: [phone]  Address:  [place] [address]

## 2020-12-04 NOTE — PROGRESS NOTE BEHAVIORAL HEALTH - AFFECT QUALITY
Elevated/Irritable
Irritable/Elevated
Elevated/Irritable
Elevated/Irritable
Irritable/Elevated

## 2020-12-04 NOTE — PROGRESS NOTE BEHAVIORAL HEALTH - BEHAVIOR
Cooperative
Uncooperative
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
## 2020-12-04 NOTE — PROGRESS NOTE BEHAVIORAL HEALTH - RISK ASSESSMENT
Low-No prior SI/SA; No drug abuse hx; Not overtly psychotic  Protective Factors--Domiciled Supportive brother  Mitigating factors--Meds compliant; In-Patient Hospitalization; OP F/U after discharge

## 2020-12-04 NOTE — PROGRESS NOTE BEHAVIORAL HEALTH - AFFECT RANGE
Labile

## 2020-12-04 NOTE — DISCHARGE NOTE BEHAVIORAL HEALTH - NSBHDCSWCOMMENTSFT_PSY_A_CORE
Patient educated to not stop any medication unless otherwise told to do so by outpatient provider Patient educated to not stop any medication unless otherwise told to do so by outpatient provider. Pt. is electing to refusing assistance for arranging aftercare but is accepting list of resources and encouraged to make her own appontment to support future coping. Pt. is encoruaged to call for assistance with making aftercare appointment in the future as needed. Pt. was made aware of crisis resources to use after hours as needed including returning to the hospital. Pt. was educated regarding safety precautions for COVID-19 including hand hygiene, wearing a mask, and maintaining social distancing and advised to seek medical attention if experiencing any symptoms. Pt. provided written handout of COVID-19 symptoms and management in discharge packet.

## 2020-12-04 NOTE — PROGRESS NOTE BEHAVIORAL HEALTH - NSBHLEGALSTATUS_PSY_A_CORE
9.27 (2PC)

## 2020-12-04 NOTE — PROGRESS NOTE BEHAVIORAL HEALTH - THOUGHT CONTENT
Delusions

## 2020-12-04 NOTE — PROGRESS NOTE BEHAVIORAL HEALTH - NSBHADMITIPOBSFT_PSY_A_CORE
Routine Admission

## 2020-12-04 NOTE — PROGRESS NOTE BEHAVIORAL HEALTH - THOUGHT PROCESS
Linear

## 2020-12-04 NOTE — PROGRESS NOTE BEHAVIORAL HEALTH - NSBHADMITMEDEDUDETAILS_A_CORE FT
Discussed risks/benefits/s-e

## 2020-12-05 NOTE — CHART NOTE - NSCHARTNOTEFT_GEN_A_CORE
Voice message left x2.  Awaiting return call. Patient arrived home safely and sail she is feeling fine and doing well.

## 2020-12-08 DIAGNOSIS — R74.01 ELEVATION OF LEVELS OF LIVER TRANSAMINASE LEVELS: ICD-10-CM

## 2020-12-08 DIAGNOSIS — F20.9 SCHIZOPHRENIA, UNSPECIFIED: ICD-10-CM

## 2020-12-08 DIAGNOSIS — F32.9 MAJOR DEPRESSIVE DISORDER, SINGLE EPISODE, UNSPECIFIED: ICD-10-CM

## 2020-12-08 DIAGNOSIS — F22 DELUSIONAL DISORDERS: ICD-10-CM

## 2020-12-08 DIAGNOSIS — F29 UNSPECIFIED PSYCHOSIS NOT DUE TO A SUBSTANCE OR KNOWN PHYSIOLOGICAL CONDITION: ICD-10-CM

## 2020-12-08 DIAGNOSIS — K76.0 FATTY (CHANGE OF) LIVER, NOT ELSEWHERE CLASSIFIED: ICD-10-CM

## 2020-12-08 DIAGNOSIS — Z53.20 PROCEDURE AND TREATMENT NOT CARRIED OUT BECAUSE OF PATIENT'S DECISION FOR UNSPECIFIED REASONS: ICD-10-CM

## 2020-12-08 DIAGNOSIS — Z88.0 ALLERGY STATUS TO PENICILLIN: ICD-10-CM

## 2020-12-08 DIAGNOSIS — J45.909 UNSPECIFIED ASTHMA, UNCOMPLICATED: ICD-10-CM

## 2020-12-08 DIAGNOSIS — F41.9 ANXIETY DISORDER, UNSPECIFIED: ICD-10-CM

## 2022-05-30 NOTE — BEHAVIORAL HEALTH ASSESSMENT NOTE - NSBHPSYCHMEDSHX_PSY_A_CORE
Em Casanova was seen and treated in our emergency department on 5/30/2022.     Em Casanova cannot return to work until cleared by an orthopedic specialist.    Fazal Viveros MD no

## 2022-09-04 NOTE — PROGRESS NOTE BEHAVIORAL HEALTH - NS ED BHA MSE GENERAL APPEARANCE
Normal vision: sees adequately in most situations; can see medication labels, newsprint Well developed

## 2022-09-07 ENCOUNTER — EMERGENCY (EMERGENCY)
Facility: HOSPITAL | Age: 37
LOS: 1 days | Discharge: HOME | End: 2022-09-07
Attending: STUDENT IN AN ORGANIZED HEALTH CARE EDUCATION/TRAINING PROGRAM
Payer: COMMERCIAL

## 2022-09-07 VITALS
HEART RATE: 110 BPM | TEMPERATURE: 99 F | OXYGEN SATURATION: 97 % | HEIGHT: 63 IN | SYSTOLIC BLOOD PRESSURE: 127 MMHG | WEIGHT: 110.01 LBS | DIASTOLIC BLOOD PRESSURE: 80 MMHG | RESPIRATION RATE: 16 BRPM

## 2022-09-07 LAB
ALBUMIN SERPL ELPH-MCNC: 4.2 G/DL — SIGNIFICANT CHANGE UP (ref 3.3–5.2)
ALP SERPL-CCNC: 444 U/L — HIGH (ref 40–120)
ALT FLD-CCNC: 70 U/L — HIGH
ANION GAP SERPL CALC-SCNC: 18 MMOL/L — HIGH (ref 5–17)
AST SERPL-CCNC: 71 U/L — HIGH
BILIRUB SERPL-MCNC: 1 MG/DL — SIGNIFICANT CHANGE UP (ref 0.4–2)
BUN SERPL-MCNC: 8.9 MG/DL — SIGNIFICANT CHANGE UP (ref 8–20)
CALCIUM SERPL-MCNC: 9.4 MG/DL — SIGNIFICANT CHANGE UP (ref 8.4–10.5)
CHLORIDE SERPL-SCNC: 101 MMOL/L — SIGNIFICANT CHANGE UP (ref 98–107)
CO2 SERPL-SCNC: 20 MMOL/L — LOW (ref 22–29)
CREAT SERPL-MCNC: 0.71 MG/DL — SIGNIFICANT CHANGE UP (ref 0.5–1.3)
EGFR: 111 ML/MIN/1.73M2 — SIGNIFICANT CHANGE UP
GLUCOSE SERPL-MCNC: 109 MG/DL — HIGH (ref 70–99)
HCG SERPL-ACNC: <4 MIU/ML — SIGNIFICANT CHANGE UP
HCT VFR BLD CALC: 41 % — SIGNIFICANT CHANGE UP (ref 34.5–45)
HGB BLD-MCNC: 14.2 G/DL — SIGNIFICANT CHANGE UP (ref 11.5–15.5)
MCHC RBC-ENTMCNC: 30.4 PG — SIGNIFICANT CHANGE UP (ref 27–34)
MCHC RBC-ENTMCNC: 34.6 GM/DL — SIGNIFICANT CHANGE UP (ref 32–36)
MCV RBC AUTO: 87.8 FL — SIGNIFICANT CHANGE UP (ref 80–100)
PLATELET # BLD AUTO: 370 K/UL — SIGNIFICANT CHANGE UP (ref 150–400)
POTASSIUM SERPL-MCNC: 3.3 MMOL/L — LOW (ref 3.5–5.3)
POTASSIUM SERPL-SCNC: 3.3 MMOL/L — LOW (ref 3.5–5.3)
PROT SERPL-MCNC: 7.8 G/DL — SIGNIFICANT CHANGE UP (ref 6.6–8.7)
RBC # BLD: 4.67 M/UL — SIGNIFICANT CHANGE UP (ref 3.8–5.2)
RBC # FLD: 12.9 % — SIGNIFICANT CHANGE UP (ref 10.3–14.5)
SODIUM SERPL-SCNC: 139 MMOL/L — SIGNIFICANT CHANGE UP (ref 135–145)
WBC # BLD: 6.53 K/UL — SIGNIFICANT CHANGE UP (ref 3.8–10.5)
WBC # FLD AUTO: 6.53 K/UL — SIGNIFICANT CHANGE UP (ref 3.8–10.5)

## 2022-09-07 PROCEDURE — 93005 ELECTROCARDIOGRAM TRACING: CPT

## 2022-09-07 PROCEDURE — 93010 ELECTROCARDIOGRAM REPORT: CPT

## 2022-09-07 PROCEDURE — 36415 COLL VENOUS BLD VENIPUNCTURE: CPT

## 2022-09-07 PROCEDURE — 99285 EMERGENCY DEPT VISIT HI MDM: CPT

## 2022-09-07 PROCEDURE — 71045 X-RAY EXAM CHEST 1 VIEW: CPT | Mod: 26

## 2022-09-07 PROCEDURE — 80053 COMPREHEN METABOLIC PANEL: CPT

## 2022-09-07 PROCEDURE — 71045 X-RAY EXAM CHEST 1 VIEW: CPT

## 2022-09-07 PROCEDURE — 85027 COMPLETE CBC AUTOMATED: CPT

## 2022-09-07 PROCEDURE — 84702 CHORIONIC GONADOTROPIN TEST: CPT

## 2022-09-07 RX ORDER — POTASSIUM CHLORIDE 20 MEQ
40 PACKET (EA) ORAL ONCE
Refills: 0 | Status: COMPLETED | OUTPATIENT
Start: 2022-09-07 | End: 2022-09-07

## 2022-09-07 RX ADMIN — Medication 30 MILLILITER(S): at 06:14

## 2022-09-07 RX ADMIN — Medication 40 MILLIEQUIVALENT(S): at 07:59

## 2022-09-07 NOTE — ED ADULT NURSE NOTE - OBJECTIVE STATEMENT
37 year old female presents to ED with complaint of chest discomfort and palpitations x 1 day.  A&Ox4  CM->NSR Patient states she was taking a shower and started feeling "flutters in my chest".  Denies difficulty breathing/SOB or any cardiac history.  Seen and evaluated by provider, orders obtained and noted.  Blood specimens obtained and sent to lab.  Medicated with Maalox as ordered.  No acute distress noted at this time.  Offers no further complaints.

## 2022-09-07 NOTE — ED PROVIDER NOTE - ATTENDING CONTRIBUTION TO CARE
U[place]ano: I performed a face to face bedside interview with patient regarding history of present illness, review of symptoms and past medical history. I completed an independent physical exam.  I have discussed patient's plan of care with resident.   I agree with note as stated above including HISTORY OF PRESENT ILLNESS, HIV, PAST MEDICAL/SURGICAL/FAMILY/SOCIAL HISTORY, ALLERGIES AND HOME MEDICATIONS, REVIEW OF SYSTEMS, PHYSICAL EXAM, MEDICAL DECISION MAKING and any PROGRESS NOTES during the time I functioned as the attending physician for this patient unless otherwise noted. My brief assessment is as follows: Patient with palpitations and chest pain. Likely anxiety +/- gerd, low risk for acs. Will work up palpitations with labs, cxr. EKG sinus tach, no RF for PE. Will reassess after treatment of current burning pain in chest.

## 2022-09-07 NOTE — ED ADULT TRIAGE NOTE - CHIEF COMPLAINT QUOTE
Ambulatory to ED c/o chest discomfort and palpitations x1 day. Patient states she was taking a shower and started feeling "flutters in my chest" (pointing to L side of chest). Patient denies difficulty breathing/SOB at triage, denies cardiac HX. EKG in progress.

## 2022-09-07 NOTE — ED PROVIDER NOTE - PROGRESS NOTE DETAILS
PADILLA Alvarado MD : received on signout. reviewed bmp. lfts improved from baseline. K 3.3 will provide oral repletion. patient asymptomatic. will d/c home

## 2022-09-07 NOTE — ED PROVIDER NOTE - PATIENT PORTAL LINK FT
You can access the FollowMyHealth Patient Portal offered by [facility] by registering at the following website: [email or web address]. By joining [employer]’s FollowMyHealth portal, you will also be able to view your health information using other applications (apps) compatible with our system. You can access the FollowMyHealth Patient Portal offered by [facility] by registering at the following website: [email or web address]. By joining [employer]’s FollowMyHealth portal, you will also be able to view your health information using other applications (apps) compatible with our system.

## 2022-09-07 NOTE — ED PROVIDER NOTE - CLINICAL SUMMARY MEDICAL DECISION MAKING FREE TEXT BOX
Patient with palpitations and chest pain. Likely gerd, low risk for acs. Will work up palpitations with labs, cxr. EKG sinus tach, no RF for PE. Will reassess after treatment of current burning pain in chest.

## 2022-09-07 NOTE — ED PROVIDER NOTE - DISCHARGE REVIEW MATERIAL PRESENTED
Pt is alert and oriented. Hard of hearing. Ambulated to the bathroom with gait belt and walker x2. TTWB on LLE. Please use left arm with blood pressure monitoring. Small, formed, brown BM x1. Applied barrier cream to sacrum area and bilateral groin areas. Left hip dressings were completed during AM shift, currently CDI. Applied ice during shift and removed at bedtime. Administered PRN Tylenol 1000 mg x1 for left hip pain. Continue w/ POC.     Temp: 97.9  F (36.6  C) Temp src: Oral BP: 166/68 Pulse: 98 Heart Rate: 87 Resp: 18 SpO2: 98 % O2 Device: None (Room air)       .

## 2022-09-07 NOTE — ED PROVIDER NOTE - NSFOLLOWUPINSTRUCTIONS_ED_ALL_ED_FT
1) Please follow-up with your primary care doctor in the next 5-7 days.  Please call tomorrow for an appointment.  If you cannot follow-up with your primary care doctor please return to the ED for any urgent issues.  2) You were given a copy of the tests performed today.  Please bring the results with you and review them with your primary care doctor.  3) If you have any worsening of symptoms or any other concerns please return to the ED immediately.  4) Please continue taking your home medications as directed.      GERD (Gastroesophageal Reflux Disease)    WHAT YOU NEED TO KNOW:    Gastroesophageal reflux disease (GERD) is reflux that happens more than 2 times a week for a few weeks. Reflux means acid and food in your stomach back up into your esophagus. GERD can cause other health problems over time if it is not treated.  Digestive Tract         DISCHARGE INSTRUCTIONS:    Call your local emergency number (911 in the [place]) if:   •You have severe chest pain and sudden trouble breathing.          Return to the emergency department if:   •You have trouble breathing after you vomit.      •You have trouble swallowing, or pain with swallowing.      •Your bowel movements are black, bloody, or tarry-looking.      •Your vomit looks like coffee grounds or has blood in it.      Call your doctor or gastroenterologist if:   •You feel full and cannot burp or vomit.      •You vomit large amounts, or you vomit often.      •You are losing weight without trying.      •Your symptoms get worse or do not improve with treatment.      •You have questions or concerns about your condition or care.      Medicines:   •Medicines are used to decrease stomach acid. Medicine may also be used to help your lower esophageal sphincter and stomach contract (tighten) more.      •Take your medicine as directed. Contact your healthcare provider if you think your medicine is not helping or if you have side effects. Tell your provider if you are allergic to any medicine. Keep a list of the medicines, vitamins, and herbs you take. Include the amounts, and when and why you take them. Bring the list or the pill bottles to follow-up visits. Carry your medicine list with you in case of an emergency.      Manage GERD:   Prevent GERD       •Do not have foods or drinks that may increase heartburn. These include chocolate, peppermint, fried or fatty foods, drinks that contain caffeine, or carbonated drinks (soda). Other foods include spicy foods, onions, tomatoes, and tomato-based foods. Do not have foods or drinks that can irritate your esophagus, such as citrus fruits, juices, and alcohol.      •Do not eat large meals. When you eat a lot of food at one time, your stomach needs more acid to digest it. Eat 6 small meals each day instead of 3 large ones, and eat slowly. Do not eat meals 2 to 3 hours before bedtime.      •Elevate the head of your bed. Place 6-inch blocks under the head of your bed frame. You may also use more than one pillow under your head and shoulders while you sleep.      •Maintain a healthy weight. If you are overweight, weight loss may help relieve symptoms of GERD.      •Do not smoke. Smoking weakens the lower esophageal sphincter and increases the risk of GERD. Ask your healthcare provider for information if you currently smoke and need help to quit. E-cigarettes or smokeless tobacco still contain nicotine. Talk to your healthcare provider before you use these products.      •Do not put pressure on your abdomen. Pressure pushes acid up into your esophagus. Do not wear clothing that is tight around your waist. Do not bend over. Bend at the knees if you need to pick something up.      Follow up with your doctor or gastroenterologist as directed: Write down your questions so you remember to ask them during your visits.

## 2022-09-07 NOTE — ED PROVIDER NOTE - OBJECTIVE STATEMENT
38 yo female with hx of anxiety presents to the ED for palpitations. Patient states that since yesterday she has had a burning feeling in her chest, worse after eating. No radiating, center of chest, intermittent. This morning assc with palpitations worse after showering. Denies SOB, cough, fever, recent travel/surgery, hx of blood clots, current OCP use.

## 2023-07-28 NOTE — BEHAVIORAL HEALTH ASSESSMENT NOTE - NSBHADMITIPDSM5_PSY_A_CORE FT
Patient has unexplained vulvar pain for the past 5 years.  Patient states that it started after her IUD was removed in 2018.  Patient states that she would like to follow-up 1.  Patient instructed to try topical lidocaine to the affected area for treatment.   25

## 2024-02-05 NOTE — PROGRESS NOTE BEHAVIORAL HEALTH - NS ED [facility] REVIEW OF ED CHART VITAL SIGNS REVIEWED
2/5/2024         RE: Ruth Vanegas  [address]  [place] [address]        Dear Colleague,    Thank you for referring your patient, Ruth Vanegas, to the [facility] [place]. Please see a copy of my visit note below.    NEUROLOGY OUTPATIENT PROGRESS NOTE   Feb 5, 2024     CHIEF COMPLAINT/REASON FOR VISIT/REASON FOR CONSULT  Patient presents with:  Follow Up    REASON FOR CONSULTATION- Headaches    REFERRAL SOURCE  Dr. Rosangela Haynes  CC Dr. Rosangela Haynes    HISTORY OF PRESENT ILLNESS  Ruth Vanegas is a 36 year old female seen today for evaluation of headaches.  She reports that she has had headaches for years these would come and go.  Over the last few months these have become more constant and lasting longer.  She reports that she has 15 headache days a month.  Both temples are involved.  Headaches are throbbing in nature with photophobia and phonophobia.  She denies any triggers for her headaches.    She is tried sumatriptan in the past which made things worse.  She has tried Excedrin but she has to catch the headaches soon enough to make it work.  She is currently on nortriptyline 50 mg at night which is not helping.  The nortriptyline was also being used as a sleep aid.  She is also on Topamax which is not helping.  She is taking 50 mg in the evening and 25 mg in the morning.  She further complains of vertigo which is sometimes with the headache.  She does have issues with uncontrolled sugars and feels that the Topamax might be helpful.  Denies any visual auras.    12/21/21  Patient returns today.  She reports that the headaches have become more frequent and she is still having them every day.  Is taking 100 mg twice daily of the Topamax with no side effects.  50 mg of nortriptyline at night and is not sleeping well with this dose.  Is interested in trying a higher medication.  Is taking Maxalt which is working though has to use it almost every day.  Is  working on losing weight.  Reports that her diabetes has been under good control.  Has not done the blood work that had ordered.    8/15/22  Patient returns today.  Headaches have become much more frequent and she is still having them every day.  Her Topamax was increased to 100 mg twice a day endocrinology and she feels no side effects with no benefit as well.  Remains on 50 mg of nortriptyline at night for sleep.  She is also taking melatonin for sleep.  Verapamil was added previously which did not really help.  Diabetes has been under good control.  Maxalt does work occasionally but not all the time.  Blood work has been done which was negative.  Reports no other new symptoms.    9/20/22  Patient returns today.  She reports that the headaches have gotten worse since she was last seen.  She is having more severe headaches almost every day.  Remains on Topamax and verapamil.  She was on nortriptyline and this has been stopped.  She reports that this was stopped several months ago when she was not on it in August.  Maxalt does work but the benefit does not last the full time.  She did start the Ajovy which might have made the headaches worse.  Was discussed through phone messages about starting her on Botox and she wants to do that today.  For the insomnia she is seeing her GI doctor who is prescribing some medication that would help with her GI symptoms as well as with the insomnia.  She has not really started this medication at this point.  No other new issues/concerns.  No other new triggers    12/20/22  Patient returns today.  With the Botox she did have worsening headaches the first 2 weeks.  Headaches then improved for 2 months in the last 2 weeks she is been having headaches again.  Headaches unchanged in nature.  Remains on Topamax and verapamil.  Maxalt does help with abortive therapy.  Remains on melatonin for insomnia.  No other new issues.  Does complain of some eyebrow side effects with the Botox.  No  other new concerns.    2/17/23  Patient returns today.  She had Botox about 2 months ago.  The first Botox had provided significant benefit though she feels no benefit from this Botox.  Headaches are there almost every day.  Still has some raising of the eyebrows.  Reports no other provoking factors.  Headaches are more in the frontal region.  Is getting good sleep at night.  Is only working 3 nights a week.  Gaudencio does work for abortive therapy if she can catch the headache in time.  Symptoms she wakes up with a headache.  Has not really tried Excedrin Migraine.    Discussed Emgality and is afraid of trying that because of Ajovy causing side effects.  Does not want to do Depakote because of weight gain.  Nortriptyline is no longer be considered through GI.    10/4/23  Patient returns today.  Previously she was getting Botox which was working really well for her though she stopped the Botox because of cosmetic side effects of her eyebrows being raised.  Headaches have worsened again.  She has them every day.  They can be severe.  Rizatriptan does seem to be helping though occasionally she will wake up with headaches and these are not very effective.  Did not tolerate the protriptyline.  Remains on the Topamax and verapamil and is finding benefit.  No other new concerns.  2.  Is getting good sleep though not always.  Has not been using the melatonin though wants to use it again.    2/5/24  Patient returns today.  Headaches have gotten worse.  She is having multiple headaches a week.  Cannot tell me exactly why headaches are worse.  There is slightly increased stress.  Headaches are unchanged in nature.  Rizatriptan is not effective.  In reviewing her medication list she is still on the Topamax though she stopped the verapamil and never got the Emgality.  She stopped the verapamil because she did not get a refill.  It is unclear why the pharmacy did not give her these medications.    She is still having difficulty  falling asleep at night.  Is now using gabapentin instead of melatonin.  No other new symptoms.    Previous history is reviewed and this is unchanged.    PAST MEDICAL/SURGICAL HISTORY  Past Medical History:   Diagnosis Date     Anemia     told to take iron pills when pregnant     Benign paroxysmal positional vertigo, unspecified laterality 02/26/2021     Depression      Depressive disorder      Diabetes mellitus (H)      Diabetes mellitus, type 2 (H) 07/15/2021     Dysthymic disorder      Endometriosis      Herpes genitalia      Hyperlipidemia      Kidney stone      Menorrhagia      Migraines      Neuropathy      Other abnormal Papanicolaou smear of cervix and cervical HPV(795.09) 01/02/2013     PCOS (polycystic ovarian syndrome)      Post traumatic stress disorder (PTSD)      Patient Active Problem List   Diagnosis     Class 3 severe obesity with serious comorbidity and body mass index (BMI) of 40.0 to 44.9 in adult (H)     Other abnormal Papanicolaou smear of cervix and cervical HPV(795.09)     Chronic nausea     Chronic vomiting     Diabetes mellitus, type 2 (H)     Unintentional weight loss     Flatulence, eructation and gas pain     Morbid obesity (H)     Hyperglycemia     Major depressive disorder, single episode, severe without psychotic features (H)     Esophageal dysphagia     Encounter prior to initiation of medication     S/P laparoscopic hysterectomy     Hyperlipidemia     History of cervical dysplasia   Significant for diabetes, migraines, depression, insomnia, difficulty keeping food down, constipation    FAMILY HISTORY  Family History   Adopted: Yes   Problem Relation Age of Onset     Chronic Obstructive Pulmonary Disease Mother      Prostate Cancer Father      Cancer Father         prostate     Alcoholism Sister      Alcoholism Sister      Alcoholism Brother      Alcoholism Brother      Diabetes Paternal Grandmother      Other Cancer Paternal Grandmother      Alcoholism Daughter      Coronary Artery  Disease No family hx of      Urolithiasis No family hx of      Clotting Disorder No family hx of      Gout No family hx of      Heart Disease No family hx of      Anesthesia Reaction No family hx of    Family history positive for migraines in her brother.  Also family history of diabetes.    SOCIAL HISTORY  Social History     Tobacco Use     Smoking status: Never     Smokeless tobacco: Never   Vaping Use     Vaping Use: Never used   Substance Use Topics     Alcohol use: Yes     Comment: Socially     Drug use: Yes     Types: Marijuana       SYSTEMS REVIEW  Twelve-system ROS was done and other than the HPI this was negative except for neck pain, back pain, arm and leg pain, joint pain, numbness and tingling, weakness paralysis, difficulty walking, falling, balance coordination problems, dizziness, ringing in the ears, sleeping problems, headaches, anxiety, depression, bloating, stomach pain, weight gain, appetite problems  No new concerns/issues.    MEDICATIONS  acetaminophen (TYLENOL) 500 MG tablet, Take 500-1,000 mg by mouth every 4 hours as needed   BD PEN NEEDLE RAMON 2ND GEN 32G X 4 MM miscellaneous, USE 4 PEN NEEDLES DAILY OR AS DIRECTED.  Continuous Blood Gluc Sensor (FREESTYLE CINDY 14 DAY SENSOR) MISC, 1 Application. every 14 days  Continuous Blood Gluc Sensor (FREESTYLE CINDY 2 SENSOR) MISC, 1 each See Admin Instructions Change every 14 days.  dexAMETHasone (DECADRON) 4 MG/ML injection, To be used topically by therapist during PT sessions.  diazepam (VALIUM) 5 MG tablet, once as needed Only for Dentist  fluconazole (DIFLUCAN) 150 MG tablet, Take 1 tablet (150 mg) by mouth daily  gabapentin (NEURONTIN) 300 MG capsule, Take 2 capsules (600 mg) by mouth 3 times daily  ibuprofen (ADVIL/MOTRIN) 600 MG tablet, every 4 hours as needed   insulin  UNIT/ML injection, 24 units  To be taken with prednisone 60 mg, hold it if not taking steroids  metFORMIN (GLUCOPHAGE XR) 500 MG 24 hr tablet, TAKE 2 TABLETS(1000 MG)  BY MOUTH TWICE DAILY WITH MEALS  multivitamin (ONE-DAILY) tablet, Take 1 tablet by mouth daily  NOVOLOG FLEXPEN 100 UNIT/ML soln, Take 5- 10 units before each meal.  ondansetron (ZOFRAN) 4 MG tablet, Take 1 tablet (4 mg) by mouth every 6 hours as needed for nausea  promethazine (PHENERGAN) 25 MG tablet, Take 25 mg by mouth every 6 hours as needed  vitamin D3 (CHOLECALCIFEROL) 125 MCG (5000 UT) tablet, Take 1 tablet (125 mcg) by mouth every morning  atorvastatin (LIPITOR) 10 MG tablet, Take 2 tablets (20 mg) by mouth daily for 30 days atorvastatin 10 mg tablet  insulin glargine (LANTUS PEN) 100 UNIT/ML pen, Inject 24 Units Subcutaneous At Bedtime for 90 days  tolterodine ER (DETROL LA) 4 MG 24 hr capsule, Take 1 capsule (4 mg) by mouth daily    0.5 mL bupivacaine (MARCAINE) 0.5% injection (50 mL vial)  0.5 mL bupivacaine (MARCAINE) 0.5% injection (50 mL vial)  0.5 mL bupivacaine (MARCAINE) 0.5% injection (50 mL vial)  0.5 mL bupivacaine (MARCAINE) 0.5% injection (50 mL vial)  Botulinum Toxin Type A (BOTOX) 200 units injection 200 Units  triamcinolone (KENALOG-40) injection 40 mg  triamcinolone (KENALOG-40) injection 40 mg  triamcinolone (KENALOG-40) injection 40 mg  triamcinolone (KENALOG-40) injection 40 mg       PHYSICAL EXAMINATION  VITALS: /78   Pulse 78   Resp 16   Wt 123.8 kg (273 lb)   LMP  (LMP Unknown)   BMI 44.06 kg/m    GENERAL: Healthy appearing, alert, no acute distress, normal habitus.  CARDIOVASCULAR: Extremities warm and well perfused. Pulses present.   NEUROLOGICAL:  Patient is awake and oriented to self, place and time.  Attention span is normal.  Memory is grossly intact.  Language is fluent and follows commands appropriately.  Appropriate fund of knowledge. Cranial nerves 2-12 are intact. There is no pronator drift.  Motor exam shows 5/5 strength in all extremities.  Tone is symmetric bilaterally in upper and lower extremities.  (Previously reflexes are symmetric and 2+ in upper  extremities and lower extremities. Sensory exam is grossly intact to light touch, pin prick and vibration.)  Finger to nose and heel to shin is without dysmetria.  Romberg is negative.  Gait is normal and the patient is able to do tandem walk and walk on toes and heels.  No change in exam.    DIAGNOSTICS  CT head-images reviewed.  No major structural lesions noted.  IMPRESSION:  1.  No acute intracranial process.      CT 2020  HEAD CT:   1.  No acute intracranial process.     CERVICAL SPINE CT:   1.  No acute cervical spine fracture.    OUTSIDE RECORDS  Outside referral notes and chart notes were reviewed and pertinent information has been summarized (in addition to the HPI):-Patient seen for headaches.  Was seen in endocrinology for diabetes.  Was referred to neurology.  Was also having some ankle and feet swelling.  Also has nausea related to headaches.  Has been seen in ENT for benign positional paroxysmal vertigo.  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  Was recommended to see occupational therapy.    LABS  Component      Latest Ref Rng & Units 2/28/2022   Vitamin B12      193 - 986 pg/mL 437   Ferritin      12 - 150 ng/mL 93   TSH      0.40 - 4.00 mU/L 1.71     MRI  IMPRESSION:  1.  No acute/subacute infarcts, mass lesions, hydrocephalus or MRI evidence of intracranial hemorrhage.      IMPRESSION/REPORT/PLAN  Intractable chronic migraine without aura and without status migrainosus  Primary insomnia  History of diabetes    This is a 36 year old female with chronic headache suggestive of chronic migraines and insomnia.  Her head CT has been negative for structural lesions.  Blood work has been noncontributory.  Brain negative for structural lesions.    For prevention of headaches:-Higher doses of Topamax have not helped with the headaches.  Propanolol cannot be used because of history of diabetes.  Verapamil has not helped with the headaches as well.  Ajovy was prescribed which actually made  the headaches worse.  She is also tried nortriptyline in the past for insomnia which did not help with the headaches.  Protriptyline caused side effects.  We will continue to provide benefit that she is concerned about the side effects.  Wants to hold off on Botox for right now.  Continue Topamax and verapamil with goal of weaning her off the verapamil.  We will add Emgality to see if it works better.  Could always go back to the Botox.    Headaches have worsened with stopping the verapamil and Emgality.  Will represcribe.  Will continue Topamax, verapamil and Emgality.    For abortive therapy:- Sumatriptan has caused headaches to get worse.  Maxalt is more helpful as abortive therapy and will continue that.  She can use the Maxalt with over-the-counter medications to see if it becomes more effective.  She does have some issues with catching the headaches on time.  Continue to try Excedrin Migraine with the Maxalt.  Continue.    Continue Maxalt for right now.  It become effective again with use of preventive medication.    She previously was on melatonin for insomnia and now has been switched to gabapentin.  Does have difficulty falling asleep though no difficulty with maintaining sleep.    Topamax might be helping with the diabetes as well.  Could consider leaving her on this.    She will keep a log of her headaches.  We will meet back in 3-4 months.    -Gabapentin.  Prescribed through psychiatry.  -     verapamil ER (VERELAN) 120 MG 24 hr capsule; Take 1 capsule (120 mg) by mouth at bedtime  -     rizatriptan (MAXALT) 10 MG tablet; Take 1 tablet (10 mg) by mouth at onset of headache for migraine May repeat in 2 hours.  -     topiramate (TOPAMAX) 100 MG tablet; Take 1 tablet (100 mg) by mouth 2 times daily  -     galcanezumab-gnlm (EMGALITY) 120 MG/ML injection; Inject 1 mL (120 mg) Subcutaneous every 28 days  -     galcanezumab-gnlm (EMGALITY) 120 MG/ML injection; Inject 2 mLs (240 mg) Subcutaneous every 28 days  Loading dose.      Return in about 4 months (around 6/5/2024) for In-Clinic Visit (must).    Over 31 minutes were spent coordinating the care for the patient on the day of the encounter.  This includes previsit, during visit and post visit activities as documented above.  Counseling patient.  Refractory problem.  Multiple medications managed.  Social issues.  Prescription management.  (Activities include but not inclusive of reviewing chart, reviewing outside records, reviewing labs and imaging study results as well as the images, patient visit time including getting history and exam,  use if applicable, review of test results with the patient and coming up with a plan in a shared model, counseling patient and family, education and answering patient questions, EMR , EMR diagnosis entry and problem list management, medication reconciliation and prescription management if applicable, paperwork if applicable, printing documents and documentation of the visit activities.)      Roberto Bolanos MD  Neurologist  [facility]  Tel:- [phone]    This note was dictated using voice recognition software.  Any grammatical or context distortions are unintentional and inherent to the software.      Again, thank you for allowing me to participate in the care of your patient.        Sincerely,        Roberto Bolanos MD   Yes

## 2025-05-28 NOTE — PROGRESS NOTE BEHAVIORAL HEALTH - NS ED [facility] MED ROS EYES
Lab Results   Component Value Date    HGBA1C 9.1 (A) 03/19/2025   Significant risk factor for poor wound healing and infection.  -Recommend tight glycemic control per primary team  -Patient will need close follow-up with PCP and/endocrinology for better diabetic control long-term   No complaints

## 2025-07-13 NOTE — PROGRESS NOTE BEHAVIORAL HEALTH - NSBHPTASSESSDT_PSY_A_CORE
04-Dec-2020 13:58 Spiritual Care Visit  Spiritual Care Request    Reason for Visit:  Routine Visit: Introduction  Continue Visiting: Yes     Request Received From:       Focus of Care:  Visited With: Patient         Refer to :          Spiritual Care Assessment    Spiritual Assessment:                      Care Provided:  Intended Effects: Establish rapport and connectedness, Loraine affirmation, Build relationship of care and support, Helping someone feel comforted, Demonstrate caring and concern  Methods: Accompany someone in their spiritual/Mormonism practice outside your loraine tradition, Offer spiritual/Mormonism support  Interventions: [place]    Sense of Community and or Adventist Affiliation:  None         Addressed Needs/Concerns and/or Arlene Through:  Adventist Encounters  Adventist Needs: Prayer       Outcome:  Outcome of Spiritual Care Visit: Unknown     Advance Directives:         Spiritual Care Annotation    Annotation:  He seemed unaware.        2